# Patient Record
Sex: MALE | Race: BLACK OR AFRICAN AMERICAN | Employment: FULL TIME | URBAN - METROPOLITAN AREA
[De-identification: names, ages, dates, MRNs, and addresses within clinical notes are randomized per-mention and may not be internally consistent; named-entity substitution may affect disease eponyms.]

---

## 2018-08-23 ENCOUNTER — OFFICE VISIT (OUTPATIENT)
Dept: ORTHOPEDIC SURGERY | Age: 53
End: 2018-08-23

## 2018-08-23 VITALS
WEIGHT: 219 LBS | HEART RATE: 87 BPM | BODY MASS INDEX: 30.66 KG/M2 | TEMPERATURE: 98.1 F | SYSTOLIC BLOOD PRESSURE: 126 MMHG | HEIGHT: 71 IN | DIASTOLIC BLOOD PRESSURE: 69 MMHG | RESPIRATION RATE: 15 BRPM

## 2018-08-23 DIAGNOSIS — M99.05 PELVIC SOMATIC DYSFUNCTION: ICD-10-CM

## 2018-08-23 DIAGNOSIS — M99.04 SACRAL REGION SOMATIC DYSFUNCTION: ICD-10-CM

## 2018-08-23 DIAGNOSIS — M51.36 DEGENERATIVE DISC DISEASE, LUMBAR: Primary | ICD-10-CM

## 2018-08-23 DIAGNOSIS — Z98.890 H/O LAMINECTOMY: ICD-10-CM

## 2018-08-23 DIAGNOSIS — M99.03 LUMBAR REGION SOMATIC DYSFUNCTION: ICD-10-CM

## 2018-08-23 DIAGNOSIS — G57.02 PIRIFORMIS SYNDROME, LEFT: ICD-10-CM

## 2018-08-23 DIAGNOSIS — M48.061 FORAMINAL STENOSIS OF LUMBAR REGION: ICD-10-CM

## 2018-08-23 RX ORDER — PREDNISONE 20 MG/1
TABLET ORAL
Qty: 28 TAB | Refills: 0 | Status: SHIPPED | OUTPATIENT
Start: 2018-08-23 | End: 2018-10-01 | Stop reason: ALTCHOICE

## 2018-08-23 RX ORDER — OXYCODONE AND ACETAMINOPHEN 10; 325 MG/1; MG/1
1 TABLET ORAL
Qty: 21 TAB | Refills: 0 | Status: SHIPPED | OUTPATIENT
Start: 2018-08-23 | End: 2018-10-01 | Stop reason: SDUPTHER

## 2018-08-23 RX ORDER — IBUPROFEN 200 MG
TABLET ORAL
COMMUNITY

## 2018-08-23 NOTE — MR AVS SNAPSHOT
Layne Mejia 
 
 
 [address] [facility], [address] [facility] [address] 
[phone] Patient: Maurizio Lobo MRN: [id number] :1965 Visit Information Date & Time Provider Department Dept. Phone Encounter #  
 2018  2:20 PM Jorge Tay, [address] [facility] [phone] [id number] Follow-up Instructions Return in about 6 weeks (around 10/4/2018) for low back disc, piriformis left. Follow-up and Disposition History Upcoming Health Maintenance Date Due Hepatitis C Screening 1965 Pneumococcal 19-64 Medium Risk (1 of 1 - PPSV23) 3/28/1984 DTaP/Tdap/Td series (1 - Tdap) 3/28/1986 FOBT Q 1 YEAR AGE 50-75 3/28/2015 Influenza Age 5 to Adult 2018 Allergies as of 2018  Review Complete On: 2018 By: Jorge Tay,  No Known Allergies Current Immunizations  Never Reviewed No immunizations on file. Not reviewed this visit You Were Diagnosed With   
  
 Codes Comments Degenerative disc disease, lumbar    -  Primary ICD-10-CM: M51.36 
ICD-9-CM: 722.52 Foraminal stenosis of lumbar region     ICD-10-CM: M99.83 ICD-9-CM: 724.02 Piriformis syndrome, left     ICD-10-CM: G57.02 
ICD-9-CM: 355.0 H/O laminectomy     ICD-10-CM: Z98.890 ICD-9-CM: V45.89 Pelvic somatic dysfunction     ICD-10-CM: M99.05 
ICD-9-CM: 739.5 Sacral region somatic dysfunction     ICD-10-CM: M99.04 
ICD-9-CM: 739.4 Lumbar region somatic dysfunction     ICD-10-CM: M99.03 
ICD-9-CM: 739.3 Vitals BP Pulse Temp Resp Height(growth percentile) Weight(growth percentile) 126/69 87 98.1 °F (36.7 °C) 15 5' 11\" (1.803 m) 219 lb (99.3 kg) BMI Smoking Status 30.54 kg/m2 Current Every Day Smoker Vitals History BMI and BSA Data Body Mass Index Body Surface Area 30.54 kg/m 2 2.23 m 2 Preferred Pharmacy Pharmacy Name Phone Theresa [address] [place] [address] [address] Cherie Chaney Your Updated Medication List  
  
   
This list is accurate as of 8/23/18  3:25 PM.  Always use your most recent med list.  
  
  
  
  
 ibuprofen 200 mg tablet Commonly known as:  MOTRIN Take  by mouth. oxyCODONE-acetaminophen  mg per tablet Commonly known as:  PERCOCET Take 1 Tab by mouth every eight (8) hours as needed for Pain. Max Daily Amount: 3 Tabs. predniSONE 20 mg tablet Commonly known as:  Reino Betancourt Take 2 tabs in AM with food for 7 days then 1 tab in AM with food until gone Prescriptions Printed Refills  
 oxyCODONE-acetaminophen (PERCOCET)  mg per tablet 0 Sig: Take 1 Tab by mouth every eight (8) hours as needed for Pain. Max Daily Amount: 3 Tabs. Class: Print Route: Oral  
  
Prescriptions Sent to Pharmacy Refills  
 predniSONE (DELTASONE) 20 mg tablet 0 Sig: Take 2 tabs in AM with food for 7 days then 1 tab in AM with food until gone Class: Normal  
 Pharmacy: [facility] [address] [place] [address] #: [phone] We Performed the Following [place] OSTEOPATHIC MANIP,3-4 BODY REGN [id number] CPT(R)] REFERRAL TO PHYSICAL THERAPY [VJG49 Custom] Comments:  
 Evaluate and Treat 3 per week for 6-8 weeks [phone] Follow-up Instructions Return in about 6 weeks (around 10/4/2018) for low back disc, piriformis left. Referral Information Referral ID Referred By Referred To  
  
 [id number] [facility] Shadia Rutledge [address] [place] [address] Drive PT MAGNO [address] Phone: [phone] Fax: [phone] Visits Status Start Date End Date 1 New Request 8/23/18 8/23/19  If your referral has a status of pending review or denied, additional information will be sent to support the outcome of this decision. Introducing [facility]! Denilson Villarreal introduces [employer] patient portal. Now you can access parts of your medical record, email your doctor's office, and request medication refills online. 1. In your internet browser, go to [email or web address] 2. Click on the First Time User? Click Here link in the Sign In box. You will see the New Member Sign Up page. 3. Enter your [employer] Access Code exactly as it appears below. You will not need to use this code after youve completed the sign-up process. If you do not sign up before the expiration date, you must request a new code. · [employer] Access Code: [email or web address] Expires: 11/21/2018  3:25 PM 
 
4. Enter the last four digits of your Social Security Number (xxxx) and Date of Birth (mm/dd/yyyy) as indicated and click Submit. You will be taken to the next sign-up page. 5. Create a Instilling Values ID. This will be your [employer] login ID and cannot be changed, so think of one that is secure and easy to remember. 6. Create a [employer] password. You can change your password at any time. 7. Enter your Password Reset Question and Answer. This can be used at a later time if you forget your password. 8. Enter your e-mail address. You will receive e-mail notification when new information is available in [address] 9. Click Sign Up. You can now view and download portions of your medical record. 10. Click the Download Summary menu link to download a portable copy of your medical information. If you have questions, please visit the Frequently Asked Questions section of the [employer] website. Remember, [employer] is NOT to be used for urgent needs. For medical emergencies, dial 911. Now available from your iPhone and Android! Please provide this summary of care documentation to your next provider. Your primary care clinician is listed as Vicky Canales.  If you have any questions after today's visit, please call [phone].

## 2018-08-23 NOTE — PROGRESS NOTES
HISTORY OF PRESENT ILLNESS    Shayy Serrano is a 48y.o. year old male comes in today as new patient for: low back pain    Has had pain low back for years and had surgery with Dr. Roberth Olguin 2008 hemilaminectomy L5-S1. Which helped Sx but ha shad epidural injections off and on for years but had to stop getting them as he would be out of work for several months after. Back went out [id number] and was to see Dr. Roberth Olguin and had MRI but stopped participating with his insurance so is here to see me. Has been trying to work through it with ibuprofen but worsening. No PT in many years and no stretch/HEP since 2017. Last prednisone a few months ago. 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 Had been in pain management prior. IMAGING: MRI lumbar 7/14/18   IMPRESSION:  Disc bulge left S1 descending nerve root w/ left lateral foraminal stenosis. Social History     Social History    Marital status: UNKNOWN     Spouse name: N/A    Number of children: N/A    Years of education: N/A     Social History Main Topics    Smoking status: Current Every Day Smoker     Types: Cigarettes    Smokeless tobacco: Never Used    Alcohol use Yes      Comment: occasionally    Drug use: No    Sexual activity: Not Asked     Other Topics Concern    None     Social History Narrative     Current Outpatient Prescriptions   Medication Sig Dispense Refill    ibuprofen (MOTRIN) 200 mg tablet Take  by mouth. Past Medical History:   Diagnosis Date    Acid reflux     Chronic back pain     Hematuria     Hematuria, microscopic     Nocturia      Family History   Problem Relation Age of Onset    Family history unknown: Yes         ROS:  Numb/tingle left left from buttocks. No incont, fever. All other systems reviewed and negative aside from that written in the HPI.       Objective:  /69  Pulse 87  Temp 98.1 °F (36.7 °C)  Resp 15  Ht 5' 11\" (1.803 m)  Wt 219 lb (99.3 kg)  BMI 30.54 kg/m2  GEN:  Appears stated age in NAD. NEURO:  Sensation intact to light touch. Reflexes +2/4 patellar and Achilles bilaterally. M/S:  Examined seated and supine. Slump negative. Standing flexion test positive right  Stork positive left. ASIS low right  Iliac crests equal bilaterally Pubes equal bilaterally Medial malleolus low right  Sacral base posterior left  JENNIFER low right  Sphinx test Positive left TTA at L5 on left worse flexion Rib(s) not TTP and equal LE Strength +5/5 bilaterally Piriformis tight and TTP left  EXT:  no clubbing/cyanosis. no edema. SKIN: Warm & dry w/o rash. HEENT: Conjunctiva/lids WNL. External canals/nares WNL. Tongue midline. PERRL, EOMI. Hearing intact. NECK: Trachea midline. Supple, Full ROM. No thyromegaly. CARDIAC: No edema. LUNGS: Normal effort. ABD: Soft, no masses. No HSM. PSYCH: A+O x3. Appropriate judgment and insight. Assessment/Plan:     ICD-10-CM ICD-9-CM    1. Degenerative disc disease, lumbar M51.36 722. 52 predniSONE (DELTASONE) 20 mg tablet      REFERRAL TO PHYSICAL THERAPY      oxyCODONE-acetaminophen (PERCOCET)  mg per tablet   2. Foraminal stenosis of lumbar region M99.83 724.02    3. Piriformis syndrome, left G57.02 355.0 predniSONE (DELTASONE) 20 mg tablet      REFERRAL TO PHYSICAL THERAPY      oxyCODONE-acetaminophen (PERCOCET)  mg per tablet   4. H/O laminectomy Z98.890 V45.89 predniSONE (DELTASONE) 20 mg tablet      REFERRAL TO PHYSICAL THERAPY      oxyCODONE-acetaminophen (PERCOCET)  mg per tablet   5. Pelvic somatic dysfunction M99.05 739.5 [place] OSTEOPATHIC MANIP,3-4 BODY REGN   6. Sacral region somatic dysfunction M99.04 739.4 [place] OSTEOPATHIC MANIP,3-4 BODY REGN   7. Lumbar region somatic dysfunction M99.03 739.3 OK OSTEOPATHIC MANIP,3-4 BODY REGN       Patient (or guardian if minor) verbalizes understanding of evaluation and plan. Verbal consent obtained.   Lumbar, Pelvic and Sacral SD treated with ME. Correction of previous malalignments verified after Tx. Pt tolerated well. Notes improvement of Sx and pain is now rated 2-3/10. HEP/stretches daily. Discussed stretching/strengthening/posture. Completed form for [employer] off work 6 weeks and will Rx prednisone and short term percocet after review  and RTC 6 weeks. Discussed no long term opiate Rx. Time with Pt 63 minutes, >50% of which was counseling pt regarding Dx and Tx options completion of forms and review of records and coordination of care.

## 2018-08-30 ENCOUNTER — HOSPITAL ENCOUNTER (OUTPATIENT)
Dept: PHYSICAL THERAPY | Age: 53
End: 2018-08-30

## 2018-09-07 ENCOUNTER — HOSPITAL ENCOUNTER (OUTPATIENT)
Dept: PHYSICAL THERAPY | Age: 53
Discharge: HOME | End: 2018-09-07
Payer: COMMERCIAL

## 2018-09-07 PROCEDURE — 97014 ELECTRIC STIMULATION THERAPY: CPT

## 2018-09-07 PROCEDURE — 97162 PT EVAL MOD COMPLEX 30 MIN: CPT

## 2018-09-07 PROCEDURE — 97110 THERAPEUTIC EXERCISES: CPT

## 2018-09-07 NOTE — PROGRESS NOTES
PT LUMBAR EVAL AND TREATMENT Patient Name: Ann Hawkins Date:2018 : 1965 [x]  Patient  Verified Payor: BLUE CROSS / Plan: [facility] PPO / Product Type: PPO / In time:945  Out time:1030 Total Treatment Time (min): 45 Total Timed Codes (min): 15 
1:1 Treatment Time ( only):  
Visit #: 1  Treatment Area: Other intervertebral disc degeneration, lumbar region [M51.36] Lesion of sciatic nerve, left lower limb [G57.02] SUBJECTIVE Pain Level (0-10 on visual analog scale): 9 Any medication changes, allergies to medications, diagnosis change, or new procedure performed: see summary sheet for update Assessment / key information:  Patient is a 48 y.o. male who presents to [facility] with a diagnosis of Other intervertebral disc degeneration, lumbar region [M51.36] Lesion of sciatic nerve, left lower limb [G57.02]. Patient is his own historian. Living situation is as follows: lives in 2 story house and sleeps in a recliner on 1st floor. Occupation: [employer]. Pt presents with c/o chronic LBP which became aggravated last summer. 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 Pt currently presents with LBP (R>L) which travels posterodistally to bilateral gastrocs. No numbness/LE buckling/LOB/falls. He does not use an AD. Pain is \"sharp and burning\". Pain increases with any prolonged positioning and with car/chair transfers/bed mobility/ambulating/and standing activities. Pt has had multiple epidurals in the L-S in the past and is unable to recall the dates. He also went to pain management in the past and had good results and had returned to working out afterwards to manage pain levels. Pain is constant and he is unable to identify any alleviating factors at this time.   
Current Deficits include: pain, decreased mobility, decreased strength, and decreased postural awareness with resulting limitations in ADL's and in functional abilities. Clinical exam performed and objective measurements difficult to obtain secondary to c/o pain throughout entirety of session and decreased tolerance to light touch, pressure, and position changes. + hypersensitivity to light touch. Impairments are as follows: 
 
Pain +TTP right sacral sulcus, right piriformis, and bilateral L-S paraspinals to light touch Posture trunk in 15 degrees of flexion in stance and unable to attain neutral position/extension; flattened L-S; bilateral hip flexion; bilateral genu varum and ER of hips Gait flexed antalgic gait with decreased reciprocal arm swing, no terminal hip extension bilaterally, and decreased stance RLE>LLE in midstance; decreased knee flexion bilaterally and push off AROM/PROM in degrees L-S Flexion 50 + pain Extension 0 Bilateral side flexion 10 Bilateral hip IR 10 Bilateral hip extension 0 Strength Bilateral hip flexion 3-/5 Bilateral hip extension 3-/5 Bilateral hip abduction 3/5 Bilateral knee extension and DF WFLs Special Tests + TANVI for decreased radiculopathy in LEs 
+ flexion dysfunction 
+ SI spring test 
+ right deep sacral sulcus and Gillets indicating hypomobility and possible left on left torsion Unable to accurately perform seated flexion test or standing flexion test for SI joint 
+ ASLR test at 35 degrees flexion indicative of decreased neural mobility at sciatic nn BLE Functional Tests 25% bridge with pain 
 
 
 
Manual  min:  
Rationale: provided to improve the patient's ability to Modality (rationale):  
[x]  E-Stim: type IF_ x 15_ min     []att   []unatt   []w/US   [x]w/ice   []w/heat to L-S in prone 
[]  Traction: []cerv   []pelvic   _ lbs x _ min     []pro   []sup   []int   []const 
[]  Ultrasound: []cont   []pulse    _ W/cm2 x _  min   []1MHz   []3MHz []  Iontophoresis: []take home patch w/ dexamethazone    []40mA   []80mA 
                             []_ mA min w/: []dexamethazone   []other:_ 
[]  Ice pack _  min     [] Hot pack _  min     [] Paraffin _  min 
[]  Other:  
Rationale: provided to improve the patient's ability to ambulate after session Patient Education: [x]Established HEP    [x] POT  (minutes) :15min HEP reviewed and provided Rationale: provided to improve the patient's ability to perform ADLs Pain Level (0-10 scale) post treatment: 8 
 
ASSESSMENT [x]  See Plan of Care PLAN [x]  Upgrade activities as tolerated    
[x] Other:_ See POC for Frequency/duration of visits Justification for Eval Code Complexity:  
Patient History : see POC Examination: (see exam) Clinical Presentation: unpredictable Clinical Decision Making : FOTO : 39 /100  
 
G-Codes (GP):  
 
Monisha Decker, PT 9/7/2018  12:43 PM

## 2018-09-07 NOTE — PROGRESS NOTES
[facility] PHYSICAL THERAPY AT [address] [facility] [address], [address], [place] - Phone: [phone]  Fax: [phone] PLAN OF CARE / STATEMENT OF MEDICAL NECESSITY FOR PHYSICAL THERAPY SERVICES Patient Name: Valerie Martinez : 1965 Medical  
Diagnosis: Other intervertebral disc degeneration, lumbar region [M51.36] Lesion of sciatic nerve, left lower limb [G57.02] Treatment Diagnosis: LBP Onset Date: Summer 2017 Referral Source: Juliette Yan [facility]): 2018 Prior Hospitalization: See medical history Provider #: [id number] Prior Level of Function: No pain when getting in/out of bed/car/driving/stairs/ambulating/standing Comorbidities: Tobacco use; L-S laminectomy  Medications: Verified on Patient Summary List  
The Plan of Care and following information is based on the information from the initial evaluation.  
=========================================================================================== Assessment / key information:  Patient is a 48 y.o. male who presents to [facility] with a diagnosis of Other intervertebral disc degeneration, lumbar region [M51.36] Lesion of sciatic nerve, left lower limb [G57.02]. Patient is his own historian. Living situation is as follows: lives in 2 [address] house and sleeps in a recliner on 1st floor. Occupation: [employer]. Pt presents with c/o chronic LBP which became aggravated last summer. 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 Pt currently presents with LBP (R>L) which travels posterodistally to bilateral gastrocs. No numbness/LE buckling/LOB/falls. He does not use an AD. Pain is \"sharp and burning\". Pain increases with any prolonged positioning and with car/chair transfers/bed mobility/ambulating/and standing activities.  Pt has had multiple epidurals in the L-S in the past and is unable to recall the dates. He also went to pain management in the past and had good results and had returned to working out afterwards to manage pain levels. Pain is constant and he is unable to identify any alleviating factors at this time. Current Deficits include: pain, decreased mobility, decreased strength, and decreased postural awareness with resulting limitations in ADL's and in functional abilities. Clinical exam performed and objective measurements difficult to obtain secondary to c/o pain throughout entirety of session and decreased tolerance to light touch, pressure, and position changes. + hypersensitivity to light touch. Impairments are as follows: 
 
Pain +TTP right sacral sulcus, right piriformis, and bilateral L-S paraspinals to light touch Posture trunk in 15 degrees of flexion in stance and unable to attain neutral position/extension; flattened L-S; bilateral hip flexion; bilateral genu varum and ER of hips Gait flexed antalgic gait with decreased reciprocal arm swing, no terminal hip extension bilaterally, and decreased stance RLE>LLE in midstance; decreased knee flexion bilaterally and push off AROM/PROM in degrees L-S Flexion 50 + pain Extension 0 Bilateral side flexion 10 Bilateral hip IR 10 Bilateral hip extension 0 Strength Bilateral hip flexion 3-/5 Bilateral hip extension 3-/5 Bilateral hip abduction 3/5 Bilateral knee extension and DF WFLs Special Tests + TANVI for decreased radiculopathy in LEs 
+ flexion dysfunction 
+ SI spring test 
+ right deep sacral sulcus and Gillets indicating hypomobility and possible left on left torsion Unable to accurately perform seated flexion test or standing flexion test for SI joint 
+ ASLR test at 35 degrees flexion indicative of decreased neural mobility at sciatic nn BLE Functional Tests 25% bridge with pain Functional Deficits include: difficulty getting: in/out of car, bed, chair; difficulty standing and ambulating. Patient's FOTO score was a 39/100 indicating decreased function. Patient will benefit from a POC addressing such impairments and limitations in order to improve quality of life and return to PLOF. 
 
================================================================================== Eval Complexity: History: MEDIUM  Complexity : 1-2 comorbidities / personal factors will impact the outcome/ POC Exam:HIGH Complexity : 4+ Standardized tests and measures addressing body structure, function, activity limitation and / or participation in recreation  Presentation: HIGH Complexity : Unstable and unpredictable characteristics  Clinical Decision Making:MEDIUM Complexity : FOTO score of 26-74Overall Complexity:MEDIUM Problem List: pain affecting function, decrease ROM, decrease strength, edema affecting function, impaired gait/ balance, decrease ADL/ functional abilitiies, decrease activity tolerance, decrease flexibility/ joint mobility and decrease transfer abilities Treatment Plan may include any combination of the following: Therapeutic exercise, Therapeutic activities, Neuromuscular re-education, Physical agent/modality, Gait/balance training, Manual therapy and Patient education Patient / Family readiness to learn indicated by: asking questions, trying to perform skills and interest 
Persons(s) to be included in education: patient (P) Barriers to Learning/Limitations: None Measures taken: A HEP was initiated to assist with POC in restoring function; IFC ES and ice for pain Patient Goal (s): Get back to normal and get rid of pain Patient self reported health status: poor Rehabilitation Potential: fair secondary to past PT, pain management, chronic condition ? Short Term Goals: To be accomplished in  2  treatments: 1. Pt will be compliant with HEP for symptom management at home. ? Long Term Goals: To be accomplished in  8-12  treatments: 1. Pt will demonstrate an increased FOTO score to 56/100 in order to improve function 2. Pt will be independent with HEP at D/C for self management. 3. Pt will be able to perform a bridge at > 75% completion without pain in order to increase lumbopelvic stability and reduce future injury 4. Pt will be able to bend to the floor to put on shoes without LBP in order to perform functional activities requiring L-S flexion 5. Pt will improve bilateral hip flexion by 5 degrees actively in order to increase terminal hip extension with ambulation 6. Pt will report a return to sleeping in his bed in order to prevent secondary impairments associated with long term recliner use Frequency / Duration:   Patient to be seen  2  times per week for 8-12  treatments: 
Patient / Caregiver education and instruction: activity modification and exercises G-Codes (GP):  
Therapist Signature: Stephanie Pinedo PT Date: 9/7/2018 Certification Period:  Time: 10:14 AM  
=========================================================================================== I certify that the above Physical Therapy Services are being furnished while the patient is under my care. I agree with the treatment plan and certify that this therapy is necessary. Physician Signature:        Date:       Time:    
Please sign and return to [facility] or you may fax the signed copy to  [phone]. Thank you.

## 2018-09-10 ENCOUNTER — HOSPITAL ENCOUNTER (OUTPATIENT)
Dept: PHYSICAL THERAPY | Age: 53
Discharge: HOME | End: 2018-09-10
Payer: COMMERCIAL

## 2018-09-10 PROCEDURE — 97014 ELECTRIC STIMULATION THERAPY: CPT

## 2018-09-10 PROCEDURE — 97530 THERAPEUTIC ACTIVITIES: CPT

## 2018-09-10 NOTE — PROGRESS NOTES
PHYSICAL THERAPY - DAILY TREATMENT NOTE Patient Name: Ronak Carroll        Date: 9/10/2018 : 1965   YES Patient  Verified Visit #:   2     Insurance: Payor: BLUE CROSS / Plan: [facility] PPO / Product Type: PPO / In time: 2:31 pm Out time: 3:04pm  
Total Treatment Time: 31 Medicare Time Tracking (below) Total Timed Codes (min): na 1:1 Treatment Time:  n/a  
 
TREATMENT AREA =  Other intervertebral disc degeneration, lumbar region [M51.36] Lesion of sciatic nerve, left lower limb [G57.02] SUBJECTIVE Pain Level (on 0 to 10 scale):  7 / 10- low back, left lower extremity Medication Changes/New allergies or changes in medical history, any new surgeries or procedures? NO    If yes, update Summary List  
Subjective Functional Status/Changes:  []  No changes reported Intermittent pain into left foot. Yesterday it was bothering me in the right leg. A few days ago. OBJECTIVE Modalities Rationale:     decrease edema, decrease inflammation, decrease pain and increase tissue extensibility to improve patient's ability to perform functional ADLs 10 min [x] Estim, type/location: IFC low back, post hip; 7 min prone over 2 pillows; right side lyling    
                                 []  att     [x]  unatt     []  w/US     [x]  w/ice    []  w/heat  
 min []  Mechanical Traction: type/lbs   
               []  pro   []  sup   []  int   []  cont    []  before manual    []  after manual  
 min []  Ultrasound, settings/location:    
 min []  Iontophoresis w/ dexamethasone, location:   
                                           []  take home patch       []  in clinic  
 min []  Ice     []  Heat    location/position:   
 min []  Vasopneumatic Device, press/temp:   
 min []  Other:   
[x] Skin assessment post-treatment (if applicable):   
[x]  intact    []  redness- no adverse reaction    
[]redness  adverse reaction: 13 min Therapeutic Exercise:  [x]  See flow sheet Rationale:      increase ROM and increase strength to improve the patients ability to perform functional ADLs 8 min Therapeutic Activity: Pt education in neutral spine positioning with use of towel roll in sitting, sleeping positioning, positioning for centralization, body mechanics for donning and doffing shoes Rationale:    increase ROM and increase strength to improve the patients ability to perform functional ADLS 
 
 min Patient Education:  YES  Reviewed HEP []  Progressed/Changed HEP based on: Other Objective/Functional Measures: 
 
Review abdominal draw and glut set exercise, positioning for centrlaization Post Treatment Pain Level (on 0 to 10) scale:  6-7  / 10- low back; left hip ASSESSMENT Assessment/Changes in Function:  
Pt able to centralize pain to left hip with prone over 2 pillows. Unable to abolish pain during session. Pt moved to right sidelying after ~ 7 min of prone lying over 2 pillows to ease symptoms. []  See Progress Note/Recertification Patient will continue to benefit from skilled PT services to modify and progress therapeutic interventions, address functional mobility deficits, address ROM deficits, address strength deficits, analyze and address soft tissue restrictions and instruct in home and community integration to attain remaining goals. Progress toward goals / Updated goals: 1. Pt will be compliant with HEP for symptom management at home.-goal in progress PLAN 
[]  Upgrade activities as tolerated YES Continue plan of care  
[]  Discharge due to :   
[]  Other:   
 
Therapist: Pavan Hudson PTA Date: 9/10/2018 Time: 3:04  PM  
 
Future Appointments Date Time Provider Keesha Smith 9/13/2018 3:00 PM Pavan Hudson PTA [facility]  
9/18/2018 2:30 PM Pavan Hudson PTA [facility]  
9/20/2018 3:00 PM Pavan Hudson PTA [facility]  
9/25/2018 2:30 PM Pavan Hudson PTA [facility]  
 9/27/2018 3:00 PM Harika Haile PTA [facility]  
10/2/2018 2:30 PM Harika Haile PTA [facility]

## 2018-09-13 ENCOUNTER — APPOINTMENT (OUTPATIENT)
Dept: PHYSICAL THERAPY | Age: 53
End: 2018-09-13
Payer: COMMERCIAL

## 2018-09-17 ENCOUNTER — TELEPHONE (OUTPATIENT)
Dept: ORTHOPEDIC SURGERY | Age: 53
End: 2018-09-17

## 2018-09-17 DIAGNOSIS — M51.36 DEGENERATIVE DISC DISEASE, LUMBAR: Primary | ICD-10-CM

## 2018-09-17 NOTE — TELEPHONE ENCOUNTER
Contacted pt at his number at this time. Informed him that a referral has been sent for Dr Sanju Bravo for the epidural injections.   Pt very thankful for the help with getting him scheduled for an epidural

## 2018-09-18 ENCOUNTER — APPOINTMENT (OUTPATIENT)
Dept: PHYSICAL THERAPY | Age: 53
End: 2018-09-18
Payer: COMMERCIAL

## 2018-09-20 ENCOUNTER — HOSPITAL ENCOUNTER (OUTPATIENT)
Dept: PHYSICAL THERAPY | Age: 53
Discharge: HOME | End: 2018-09-20
Payer: COMMERCIAL

## 2018-09-20 PROCEDURE — 97110 THERAPEUTIC EXERCISES: CPT

## 2018-09-20 PROCEDURE — 97014 ELECTRIC STIMULATION THERAPY: CPT

## 2018-09-20 NOTE — PROGRESS NOTES
PHYSICAL THERAPY - DAILY TREATMENT NOTE Patient Name: Valerie Martinez        Date: 2018 : 1965   YES Patient  Verified Visit #:   3     Insurance: Payor: BLUE CROSS / Plan: [facility] PPO / Product Type: PPO / In time: 3:16 Out time: 3:55 pm  
Total Treatment Time: 44 Medicare Time Tracking (below) Total Timed Codes (min):  na 1:1 Treatment Time:  na  
 
TREATMENT AREA =  Other intervertebral disc degeneration, lumbar region [M51.36] Lesion of sciatic nerve, left lower limb [G57.02] SUBJECTIVE Pain Level (on 0 to 10 scale):  6  / 10 Medication Changes/New allergies or changes in medical history, any new surgeries or procedures? NO    If yes, update Summary List  
Subjective Functional Status/Changes:  []  No changes reported For the past weekend there was no position that would help me. Today its not as bad as it was down the right side all weekend. Spoke with MD. Waiting to get scheduled for an epidural. I couldn't get out of bed this weekend. OBJECTIVE Modalities Rationale:     decrease edema, decrease inflammation, decrease pain and increase tissue extensibility to improve patient's ability to perform functional ADLs 10 min [x] Estim, type/location: Low back, hip IFC []  att     [x]  unatt     []  w/US     [x]  w/ice    []  w/heat  
 min []  Mechanical Traction: type/lbs   
               []  pro   []  sup   []  int   []  cont    []  before manual    []  after manual  
 min []  Ultrasound, settings/location:    
 min []  Iontophoresis w/ dexamethasone, location:   
                                           []  take home patch       []  in clinic  
 min []  Ice     []  Heat    location/position:   
 min []  Vasopneumatic Device, press/temp:   
 min []  Other:   
[x] Skin assessment post-treatment (if applicable):   
[x]  intact    []  redness- no adverse reaction []redness  adverse reaction:     
 
 
29 min Therapeutic Activity: Postioning for centralization, pt education in neutral spine body mechanics, use of towel roll in sitting , activity modification for pain managment Rationale:    increase ROM and increase strength to improve the patients ability to  Perform ADLs 
 
 
 min Patient Education:  Miracle Adames []  Progressed/Changed HEP based on: Other Objective/Functional Measures: 
 
Prone positioning over 1-2 pillows, prone knee flexion, standing trunk extension, pt education in centralization and peripheralization of symptoms and self management, use of ice/moist heat Post Treatment Pain Level (on 0 to 10) scale:  5/ 10-low back ASSESSMENT Assessment/Changes in Function:  
Prone lying over 2 pillow abolishes pain. Sustained prone lying over 2 pillows producing R LE sx. Positioning changed to right sidelying decreasing radicular symptoms. Pt education in positioning for centralization as tolerated. Pt able to centralize pain to low back at end of session. Unable to fully abolish. []  See Progress Note/Recertification Patient will continue to benefit from skilled PT services to modify and progress therapeutic interventions, address functional mobility deficits, address ROM deficits, address strength deficits, analyze and address soft tissue restrictions and instruct in home and community integration to attain remaining goals. Progress toward goals / Updated goals: 
Slow progress toward all goals. Partially met STG for HEP. PLAN 
[]  Upgrade activities as tolerated YES Continue plan of care  
[]  Discharge due to :   
[]  Other:   
 
Therapist: Rebecca Saleem PTA Date: 9/20/2018 Time: 3:55  PM  
 
Future Appointments Date Time Provider Keesha Smith 9/25/2018 2:30 PM Rebecca Saleem PTA [facility]  
9/27/2018 3:00 PM Rebecca Saleem PTA [facility]  
10/1/2018 1:00 PM Rolando Gruber, [address]  
 10/2/2018 2:30 PM Cristino Pallas, PTA [facility]

## 2018-09-25 ENCOUNTER — HOSPITAL ENCOUNTER (OUTPATIENT)
Dept: PHYSICAL THERAPY | Age: 53
Discharge: HOME | End: 2018-09-25
Payer: COMMERCIAL

## 2018-09-25 PROCEDURE — 97140 MANUAL THERAPY 1/> REGIONS: CPT

## 2018-09-25 PROCEDURE — 97110 THERAPEUTIC EXERCISES: CPT

## 2018-09-25 PROCEDURE — 97014 ELECTRIC STIMULATION THERAPY: CPT

## 2018-09-25 NOTE — PROGRESS NOTES
PHYSICAL THERAPY - DAILY TREATMENT NOTE Patient Name: Ricci Parrish        Date: 2018 : 1965   YES Patient  Verified Visit #:   4     Insurance: Payor: BLUE CROSS / Plan: [facility] PPO / Product Type: PPO / In time: 2:26 pm Out time: 2:57 pm  
Total Treatment Time: 31 Medicare Time Tracking (below) Total Timed Codes (min): na 1:1 Treatment Time:  na  
 
TREATMENT AREA =  Other intervertebral disc degeneration, lumbar region [M51.36] Lesion of sciatic nerve, left lower limb [G57.02] SUBJECTIVE Pain Level (on 0 to 10 scale):  5 / 10- left hip Medication Changes/New allergies or changes in medical history, any new surgeries or procedures? NO    If yes, update Summary List  
Subjective Functional Status/Changes:  []  No changes reported Pt reports he has walked to the corner store and back. No L LE leg pain since Saturday. Doing self massage release techniques OBJECTIVE Modalities Rationale:     decrease edema, decrease inflammation, decrease pain and increase tissue extensibility to improve patient's ability to perform ADLs 10 min [x] Estim, type/location: IFC low back /post hip over 1 pillow   
                                 []  att     [x]  unatt     []  w/US     [x]  w/ice    []  w/heat  
 min []  Mechanical Traction: type/lbs   
               []  pro   []  sup   []  int   []  cont    []  before manual    []  after manual  
 min []  Ultrasound, settings/location:    
 min []  Iontophoresis w/ dexamethasone, location:   
                                           []  take home patch       []  in clinic  
 min []  Ice     []  Heat    location/position:   
 min []  Vasopneumatic Device, press/temp:   
 min []  Other:   
[x] Skin assessment post-treatment (if applicable):   
[x]  intact    []  redness- no adverse reaction    
[]redness  adverse reaction:     
 
6 min Therapeutic Exercise:  [x]  See flow sheet Rationale:      increase ROM and increase strength to improve the patients ability to perform ADLs 10 min Manual Therapy: Prone left piriformis release; STM/DTM to lower lumbar paraspoinals. MFR to left QL Rationale:      decrease pain, increase ROM and decrease trigger points to improve patient's ability to improve tissue mobility in ADLs 5 min Patient Education:  YES  Reviewed HEP, review self massage techniques []  Progressed/Changed HEP based on: Other Objective/Functional Measures: Add prone abdominal draw, review positioning, home walking , sitting positioning for pain management Post Treatment Pain Level (on 0 to 10) scale:   4  / 10- low back ASSESSMENT Assessment/Changes in Function:  
Pt now able to lie prone over 1 pillow versus 2 pillows that centralizes pain to Low back to 4-5/10. Pt demonstrating good centralization of symptoms with prone over 1 pillow. Decreasing trigger point tenderness noted left piriformis, moderate trigger point tenderness persists left Q-L 
  
[]  See Progress Note/Recertification Patient will continue to benefit from skilled PT services to modify and progress therapeutic interventions, address functional mobility deficits, address ROM deficits, address strength deficits and instruct in home and community integration to attain remaining goals. Progress toward goals / Updated goals: 
Partially met STG #1 for HEP. PLAN 
[]  Upgrade activities as tolerated YES Continue plan of care  
[]  Discharge due to :   
[]  Other:   
 
Therapist: Viktoria Casas PTA Date: 9/25/2018 Time: 2:57 PM  
 
Future Appointments Date Time Provider Keesha Smith 9/27/2018 3:00 PM Viktoria Casas PTA [facility]  
10/1/2018 1:00 PM DO SCOTT Marino  
10/2/2018 2:30 PM Viktoria Casas PTA [facility]

## 2018-09-27 ENCOUNTER — APPOINTMENT (OUTPATIENT)
Dept: PHYSICAL THERAPY | Age: 53
End: 2018-09-27
Payer: COMMERCIAL

## 2018-10-01 ENCOUNTER — OFFICE VISIT (OUTPATIENT)
Dept: ORTHOPEDIC SURGERY | Age: 53
End: 2018-10-01

## 2018-10-01 VITALS
TEMPERATURE: 98.5 F | HEIGHT: 71 IN | RESPIRATION RATE: 16 BRPM | SYSTOLIC BLOOD PRESSURE: 118 MMHG | WEIGHT: 213 LBS | BODY MASS INDEX: 29.82 KG/M2 | HEART RATE: 87 BPM | DIASTOLIC BLOOD PRESSURE: 84 MMHG

## 2018-10-01 DIAGNOSIS — Z98.890 H/O LAMINECTOMY: ICD-10-CM

## 2018-10-01 DIAGNOSIS — G57.02 PIRIFORMIS SYNDROME, LEFT: ICD-10-CM

## 2018-10-01 DIAGNOSIS — M51.36 DEGENERATIVE DISC DISEASE, LUMBAR: Primary | ICD-10-CM

## 2018-10-01 DIAGNOSIS — M48.061 FORAMINAL STENOSIS OF LUMBAR REGION: ICD-10-CM

## 2018-10-01 RX ORDER — OXYCODONE AND ACETAMINOPHEN 10; 325 MG/1; MG/1
1 TABLET ORAL
Qty: 21 TAB | Refills: 0 | Status: SHIPPED | OUTPATIENT
Start: 2018-10-01 | End: 2018-12-11 | Stop reason: SDUPTHER

## 2018-10-01 NOTE — MR AVS SNAPSHOT
[address] [place] [address], [address] [facility] [address] 
[phone] Patient: Ghulam Brito MRN: [id number] :1965 Visit Information Date & Time Provider Department Dept. Phone Encounter #  
 10/1/2018  1:00 PM Elsi Gutiérrez, [address] [facility] - [phone] [id number] Upcoming Health Maintenance Date Due Hepatitis C Screening 1965 Pneumococcal 19-64 Medium Risk (1 of 1 - PPSV23) 3/28/1984 DTaP/Tdap/Td series (1 - Tdap) 3/28/1986 Shingrix Vaccine Age 50> (1 of 2) 3/28/2015 FOBT Q 1 YEAR AGE 50-75 3/28/2015 Influenza Age 5 to Adult 2018 Allergies as of 10/1/2018  Review Complete On: 10/1/2018 By: Elsi Gutiérrez DO No Known Allergies Current Immunizations  Never Reviewed No immunizations on file. Not reviewed this visit You Were Diagnosed With   
  
 Codes Comments Degenerative disc disease, lumbar    -  Primary ICD-10-CM: M51.36 
ICD-9-CM: 722.52   
 H/O laminectomy     ICD-10-CM: Z98.890 ICD-9-CM: V45.89 Piriformis syndrome, left     ICD-10-CM: G57.02 
ICD-9-CM: 355.0 Foraminal stenosis of lumbar region     ICD-10-CM: M99.83 ICD-9-CM: 724.02 Vitals BP Pulse Temp Resp Height(growth percentile) Weight(growth percentile) 118/84 87 98.5 °F (36.9 °C) 16 5' 11\" (1.803 m) 213 lb (96.6 kg) BMI Smoking Status 29.71 kg/m2 Current Every Day Smoker Vitals History BMI and BSA Data Body Mass Index Body Surface Area  
 29.71 kg/m 2 2.2 m 2 Preferred Pharmacy Pharmacy Name Phone [facility] [address] [place] [address] Cherie Chaney Your Updated Medication List  
  
   
This list is accurate as of 10/1/18  1:23 PM.  Always use your most recent med list.  
  
  
  
  
 ibuprofen 200 mg tablet Commonly known as:  MOTRIN Take  by mouth. oxyCODONE-acetaminophen  mg per tablet Commonly known as:  PERCOCET Take 1 Tab by mouth every eight (8) hours as needed for Pain. Max Daily Amount: 3 Tabs. Prescriptions Printed Refills  
 oxyCODONE-acetaminophen (PERCOCET)  mg per tablet 0 Sig: Take 1 Tab by mouth every eight (8) hours as needed for Pain. Max Daily Amount: 3 Tabs. Class: Print Route: Oral  
  
We Performed the Following REFERRAL TO [facility] [HPD58 Custom] To-Do List   
 10/02/2018 2:30 PM  
  Appointment with Ivonne Chery PTA at [address] [place] [address],[address]  ([phone]) Referral Information Referral ID Referred By Referred To  
  
 [id number] UZAIR FRAUSTO [facility], [address] Phone: [phone] Fax: [phone] Visits Status Start Date End Date 1 New Request 10/1/18 10/1/19 If your referral has a status of pending review or denied, additional information will be sent to support the outcome of this decision. Introducing [facility]! [facility] introduces [employer] patient portal. Now you can access parts of your medical record, email your doctor's office, and request medication refills online. 1. In your internet browser, go to [email or web address] 2. Click on the First Time User? Click Here link in the Sign In box. You will see the New Member Sign Up page. 3. Enter your [employer] Access Code exactly as it appears below. You will not need to use this code after youve completed the sign-up process. If you do not sign up before the expiration date, you must request a new code. · [employer] Access Code: [email or web address] Expires: 11/21/2018  3:25 PM 
 
4. Enter the last four digits of your Social Security Number (xxxx) and Date of Birth (mm/dd/yyyy) as indicated and click Submit. You will be taken to the next sign-up page. 5. Create a [employer] ID. This will be your [employer] login ID and cannot be changed, so think of one that is secure and easy to remember. 6. Create a [employer] password. You can change your password at any time. 7. Enter your Password Reset Question and Answer. This can be used at a later time if you forget your password. 8. Enter your e-mail address. You will receive e-mail notification when new information is available in [address] 9. Click Sign Up. You can now view and download portions of your medical record. 10. Click the Download Summary menu link to download a portable copy of your medical information. If you have questions, please visit the Frequently Asked Questions section of the [employer] website. Remember, [employer] is NOT to be used for urgent needs. For medical emergencies, dial 911. Now available from your iPhone and Android! Please provide this summary of care documentation to your next provider. Your primary care clinician is listed as Lala Vergara. If you have any questions after today's visit, please call [phone].

## 2018-10-01 NOTE — PROGRESS NOTES
HISTORY OF PRESENT ILLNESS    Lili Fuentes is a 48y.o. year old male comes in today to be evaluated and treated for: back pain    Since last appt 1 month ago pain low back still 8/10 and even w/ PT will have some relief for a day or so but will still have pain into legs often. Interested in epidurals for relief as realizes running out of options. IMAGING: MRI lumbar 7/14/18   IMPRESSION:  Disc bulge left S1 descending nerve root w/ left lateral foraminal stenosis. Social History     Social History    Marital status:      Spouse name: N/A    Number of children: N/A    Years of education: N/A     Social History Main Topics    Smoking status: Current Every Day Smoker     Types: Cigarettes    Smokeless tobacco: Never Used    Alcohol use Yes      Comment: occasionally    Drug use: No    Sexual activity: Not Asked     Other Topics Concern    None     Social History Narrative     Current Outpatient Prescriptions   Medication Sig Dispense Refill    ibuprofen (MOTRIN) 200 mg tablet Take  by mouth.  oxyCODONE-acetaminophen (PERCOCET)  mg per tablet Take 1 Tab by mouth every eight (8) hours as needed for Pain. Max Daily Amount: 3 Tabs. 21 Tab 0     Past Medical History:   Diagnosis Date    Acid reflux     Chronic back pain     Hematuria     Hematuria, microscopic     Nocturia      Family History   Problem Relation Age of Onset    Family history unknown: Yes     ROS:  Numb/tingle left left from buttocks. No incont, fever. Objective:  /84  Pulse 87  Temp 98.5 °F (36.9 °C)  Resp 16  Ht 5' 11\" (1.803 m)  Wt 213 lb (96.6 kg)  BMI 29.71 kg/m2  GEN:  Appears stated age in NAD. NEURO:  Sensation intact to light touch. Reflexes +2/4 patellar and Achilles bilaterally. M/S:  Examined seated and supine. Slump negative. Standing flexion test positive right  Stork positive left.   ASIS low right  Iliac crests equal bilaterally Pubes equal bilaterally Medial malleolus low right  Sacral base posterior left  JENNIFER low right  Sphinx test Positive left TTA at L5 on left worse flexion Rib(s) not TTP and equal LE Strength +5/5 bilaterally Piriformis tight and TTP left  EXT:  no clubbing/cyanosis. no edema. SKIN: Warm & dry w/o rash. Assessment/Plan:     ICD-10-CM ICD-9-CM    1. Degenerative disc disease, lumbar M51.36 722.52 REFERRAL TO PHYSICIAL MEDICINE REHAB      oxyCODONE-acetaminophen (PERCOCET)  mg per tablet   2. H/O laminectomy Z98.890 V45.89 REFERRAL TO PHYSICIAL MEDICINE REHAB      oxyCODONE-acetaminophen (PERCOCET)  mg per tablet   3. Piriformis syndrome, left G57.02 355.0 oxyCODONE-acetaminophen (PERCOCET)  mg per tablet   4. Foraminal stenosis of lumbar region M99.83 724.02 REFERRAL TO PHYSICIAL MEDICINE REHAB      oxyCODONE-acetaminophen (PERCOCET)  mg per tablet       Patient (or guardian if minor) verbalizes understanding of evaluation and plan. Will refer PRM for likely epidurals and will refill perccoet #21 tabs after  reviewed. Form completed for [employer] and discussed will be transferring care to [facility] as I have nothing more to offer.

## 2018-10-01 NOTE — PROGRESS NOTES
Pt reports doing PT and states that sessions increase his pain. Pt expresses interest in epidural shots.

## 2018-10-02 ENCOUNTER — HOSPITAL ENCOUNTER (OUTPATIENT)
Dept: PHYSICAL THERAPY | Age: 53
Discharge: HOME | End: 2018-10-02
Payer: COMMERCIAL

## 2018-10-02 PROCEDURE — 97014 ELECTRIC STIMULATION THERAPY: CPT

## 2018-10-02 PROCEDURE — 97110 THERAPEUTIC EXERCISES: CPT

## 2018-10-02 PROCEDURE — 97140 MANUAL THERAPY 1/> REGIONS: CPT

## 2018-10-02 PROCEDURE — 97530 THERAPEUTIC ACTIVITIES: CPT

## 2018-10-02 NOTE — PROGRESS NOTES
PHYSICAL THERAPY - DAILY TREATMENT NOTE Patient Name: Abril Ernst        Date: 10/2/2018 : 1965   YES Patient  Verified Visit #:   5     Insurance: Payor: BLUE CROSS / Plan: [facility] PPO / Product Type: PPO / In time: 2:38  Out time: 3:25 pm  
Total Treatment Time: 52 Medicare Time Tracking (below) Total Timed Codes (min):  n/a 1:1 Treatment Time:  n/a  
 
TREATMENT AREA =  Other intervertebral disc degeneration, lumbar region [M51.36] Lesion of sciatic nerve, left lower limb [G57.02] SUBJECTIVE Pain Level (on 0 to 10 scale): 7  / 10- low back more than the hip Medication Changes/New allergies or changes in medical history, any new surgeries or procedures? NO    If yes, update Summary List  
Subjective Functional Status/Changes:  []  No changes reported Pt reports he saw Dr. Serina Bower setting him up for epidural this week. Pt reports he is getting referred to another Orthopedic. I 'm been doing the exercises and its been helping getting around over the weekend. Pt reports using ice 2-3x per day. OBJECTIVE Modalities Rationale:     decrease edema, decrease inflammation, decrease pain and increase tissue extensibility to improve patient's ability to perform functional ADLs 10 min [x] Estim, type/location: Prone over 1 pillow low back/posterior hip   
                                 []  att     [x]  unatt     []  w/US     [x]  w/ice    []  w/heat  
 min []  Mechanical Traction: type/lbs   
               []  pro   []  sup   []  int   []  cont    []  before manual    []  after manual  
 min []  Ultrasound, settings/location:    
 min []  Iontophoresis w/ dexamethasone, location:   
                                           []  take home patch       []  in clinic  
 min []  Ice     []  Heat    location/position:   
 min []  Vasopneumatic Device, press/temp:   
 min []  Other:   
[x] Skin assessment post-treatment (if applicable):   
 [x]  intact    []  redness- no adverse reaction    
[]redness  adverse reaction:     
 
37 min Therapeutic Exercise:  [x]  See flow sheet   ( 12 min billed) Rationale:      increase ROM and increase strength to improve the patients ability to perform functional ADLs 10 min Manual Therapy: Prone left piriformis release; STM/DTM to lower lumbar paraspoinals. MFR to left QL Rationale:      decrease pain, increase ROM, increase tissue extensibility and decrease trigger points to improve patient's ability to improve tissue mobility in ADLs 6 min Therapeutic Activity: Review neutral spine posture in sitting and standing, positioning for centralization Rationale:    increase ROM, increase strength and improve coordination to improve the patients ability to perform ADLs 3 min Patient Education:  YES  Reviewed HEP [x]  Progressed/Changed HEP based on: improving tolerance to exercise Other Objective/Functional Measures: Add supine abdominal draw, hip ABD/ADD YTB Post Treatment Pain Level (on 0 to 10) scale:   5 / 10-low back ASSESSMENT Assessment/Changes in Function:  
Pt demonstrating decreased muscle guarding in position changes. Pt demonstrating decreasing trigger point tenderness left piriformis after manual release. Increased muscle tension persists in bilateral lower lumbar paraspinals. Advanced gentle core stabilization strengthening in neutral spine. Pt unable to lie in prone without 1 pillow. []  See Progress Note/Recertification Patient will continue to benefit from skilled PT services to modify and progress therapeutic interventions, address functional mobility deficits, address ROM deficits, address strength deficits, analyze and address soft tissue restrictions, analyze and cue movement patterns, analyze and modify body mechanics/ergonomics and instruct in home and community integration to attain remaining goals. Progress toward goals / Updated goals: · Short Term Goals: To be accomplished in  2  treatments: 1. Pt will be compliant with HEP for symptom management at home.- goal met 10-02-18 
  
· Long Term Goals: To be accomplished in  8-12  treatments: 1. Pt will demonstrate an increased FOTO score to 56/100 in order to improve function 2. Pt will be independent with HEP at D/C for self management. 3. Pt will be able to perform a bridge at > 75% completion without pain in order to increase lumbopelvic stability and reduce future injury 4. Pt will be able to bend to the floor to put on shoes without LBP in order to perform functional activities requiring L-S flexion 5. Pt will improve bilateral hip flexion by 5 degrees actively in order to increase terminal hip extension with ambulation 6. Pt will report a return to sleeping in his bed in order to prevent secondary impairments associated with long term recliner use PLAN 
[]  Upgrade activities as tolerated YES Continue plan of care  
[]  Discharge due to :   
[]  Other:   
 
Therapist: Gregor Edmonds PTA Date: 10/2/2018 Time: 3:25  PM  
 
No future appointments.

## 2018-10-04 ENCOUNTER — APPOINTMENT (OUTPATIENT)
Dept: PHYSICAL THERAPY | Age: 53
End: 2018-10-04
Payer: COMMERCIAL

## 2018-10-09 ENCOUNTER — HOSPITAL ENCOUNTER (OUTPATIENT)
Dept: PHYSICAL THERAPY | Age: 53
Discharge: HOME | End: 2018-10-09
Payer: COMMERCIAL

## 2018-10-09 PROCEDURE — 97014 ELECTRIC STIMULATION THERAPY: CPT

## 2018-10-09 PROCEDURE — 97140 MANUAL THERAPY 1/> REGIONS: CPT

## 2018-10-09 PROCEDURE — 97110 THERAPEUTIC EXERCISES: CPT

## 2018-10-09 NOTE — PROGRESS NOTES
[address] [place] [address] PHYSICAL THERAPY AT [address] [facility] [address], [address], [address] - Phone: [phone]  Fax: [phone] PROGRESS NOTE Patient Name: William Ramos : 1965 Treatment/Medical Diagnosis: Other intervertebral disc degeneration, lumbar region [M51.36] Lesion of sciatic nerve, left lower limb [G57.02] Referral Source: Earlene Alan DO Date of Initial Visit: 18 Attended Visits: 6 Missed Visits: 2 SUMMARY OF TREATMENT Physical Therapy treatment has consisted of Therapeutic exercise for lumbar stabilization and ROM 
CURRENT STATUS Patient has progressed well in Physical Therapy, consistently reporting improving ROM, decreasing pain, and increased functional ability. Currently pain is 7/10 in low back with no radicular symptoms L LE x 2 days. Patient average pain is 7/10 . Functional deficits are prolonged positioning, chair/car transfers, bed mobiity, prolonged walking and standing. Functional improvements are prolonged sitting in chair bed mobility, walking, standing. Trunk AROM: flexion:~  40% left low back, extension:30% low back. AROM: hip flexion: R/L: 80/77 degrees. FOTO score improved from 39 to 50/100 indicating improving functional mobility. Patient would benefit from continued PT intervention in order to improve SI mobility, Flexibility and core strength, decrease/centralize pain, and for continued education on posture, body mechanics, and to address remaining impairments. Goal/Measure of Progress Goal Met?  
1.  1. Pt will be compliant with HEP for symptom management at home. Status at last Eval: n/a Current Status: Pt performing HEP. yes New Goals to be achieved in __4__  weeks: 1. Pt will demonstrate an increased FOTO score to 56/100 in order to improve function 2. Pt will be independent with HEP at D/C for self management.  
3. Pt will be able to perform a bridge at > 75% completion without pain in order to increase lumbopelvic stability and reduce future injury 4. Pt will be able to bend to the floor to put on shoes without LBP in order to perform functional activities requiring L-S flexion 5. Pt will improve bilateral hip flexion by 5 degrees actively in order to increase terminal hip extension with ambulation 6. Pt will report a return to sleeping in his bed in order to prevent secondary impairments associated with long term recliner use RECOMMENDATIONS Plan to continue 2x per week x 4 weeks If you have any questions/comments please contact us directly at  [phone]. Thank you for allowing us to assist in the care of your patient. LPTA Signature: Chidi Delvalle PTA  Date: 10/9/2018 PT Signature: Sebastián Caballero PT Time: 3:34 PM  
NOTE TO PHYSICIAN:  PLEASE COMPLETE THE ORDERS BELOW AND FAX TO [facility] Physical [facility]: [phone]. If you are unable to process this request in 24 hours please contact our office:  [phone]. 
 
___ I have read the above report and request that my patient continue as recommended.  
___ I have read the above report and request that my patient continue therapy with the following changes/special instructions:_________________________________________________________  
___ I have read the above report and request that my patient be discharged from therapy.   
 
Physician Signature:       Date:      Time:

## 2018-10-09 NOTE — PROGRESS NOTES
PHYSICAL THERAPY - DAILY TREATMENT NOTE Patient Name: Toan Arguello        Date: 10/9/2018 : 1965   YES Patient  Verified Visit #:   6     Insurance: Payor: BLUE CROSS / Plan: [facility] PPO / Product Type: PPO / In time: 3:29 pm Out time: 4:29 pm  
Total Treatment Time: 60 Medicare Time Tracking (below) Total Timed Codes (min):  na 1:1 Treatment Time:  na  
 
TREATMENT AREA =  Other intervertebral disc degeneration, lumbar region [M51.36] Lesion of sciatic nerve, left lower limb [G57.02] SUBJECTIVE Pain Level (on 0 to 10 scale):  7  / 10- low back Medication Changes/New allergies or changes in medical history, any new surgeries or procedures? NO    If yes, update Summary List  
Subjective Functional Status/Changes:  []  No changes reported Pt reports using ice every day. No radicular pain symptoms x 3 days Pt reports prone knee flexion HEP seems to relieve low back pain OBJECTIVE Modalities Rationale:     decrease edema, decrease inflammation, decrease pain and increase tissue extensibility to improve patient's ability to perform ADLs 10 min [x] Estim, type/location: IFC low back, post hip    
                                 []  att     [x]  unatt     []  w/US     [x]  w/ice    []  w/heat  
 min []  Mechanical Traction: type/lbs   
               []  pro   []  sup   []  int   []  cont    []  before manual    []  after manual  
 min []  Ultrasound, settings/location:    
 min []  Iontophoresis w/ dexamethasone, location:   
                                           []  take home patch       []  in clinic  
 min []  Ice     []  Heat    location/position:   
 min []  Vasopneumatic Device, press/temp:   
 min []  Other:   
[x] Skin assessment post-treatment (if applicable):   
[x]  intact    []  redness- no adverse reaction    
[]redness  adverse reaction:     
 
31 min Therapeutic Exercise:  [x]  See flow sheet Rationale:      increase ROM and increase strength to improve the patients ability to tolerated prolonged positioning 11 min Manual Therapy: Prone left piriformis release; STM/DTM to lower lumbar paraspoinals. MFR to left QL Rationale:      decrease pain, increase ROM, increase tissue extensibility and decrease trigger points to improve patient's ability to improve tissue mobility in functional ADLs 8 min Patient Education:  YES  Reviewed HEP []  Progressed/Changed HEP based on: Other Objective/Functional Measures: FOTO: 50  
Post Treatment Pain Level (on 0 to 10) scale:   5 / 10 ASSESSMENT Assessment/Changes in Function:  
 
See progress note [x]  See Progress Note/Recertification Patient will continue to benefit from skilled PT services to modify and progress therapeutic interventions, address functional mobility deficits, address ROM deficits, address strength deficits, analyze and address soft tissue restrictions and instruct in home and community integration to attain remaining goals. Progress toward goals / Updated goals: 
 
See progress note PLAN 
[]  Upgrade activities as tolerated YES Continue plan of care  
[]  Discharge due to :   
[]  Other:   
 
Therapist: Sabi Chavez PTA Date: 10/9/2018 Time: 4:29  PM  
 
Future Appointments Date Time Provider Keesha Smith 10/11/2018 3:30 PM Sabi Chavez PTA [facility]  
10/16/2018 4:00 PM Sabi Chavez PTA [facility]  
10/18/2018 3:30 PM Sabi Chavez PTA [facility]  
10/23/2018 4:00 PM Sabi Chavez PTA [facility]

## 2018-10-11 ENCOUNTER — HOSPITAL ENCOUNTER (OUTPATIENT)
Dept: PHYSICAL THERAPY | Age: 53
Discharge: HOME | End: 2018-10-11
Payer: COMMERCIAL

## 2018-10-11 PROCEDURE — 97014 ELECTRIC STIMULATION THERAPY: CPT

## 2018-10-11 PROCEDURE — 97140 MANUAL THERAPY 1/> REGIONS: CPT

## 2018-10-11 PROCEDURE — 97110 THERAPEUTIC EXERCISES: CPT

## 2018-10-11 NOTE — PROGRESS NOTES
PHYSICAL THERAPY - DAILY TREATMENT NOTE Patient Name: Heidi Montenegro        Date: 10/11/2018 : 1965   YES Patient  Verified Visit #:   7     Insurance: Payor: BLUE CROSS / Plan: [facility] PPO / Product Type: PPO / In time: 3:27 pm Out time: 4:04 pm  
Total Treatment Time: 37 Medicare Time Tracking (below) Total Timed Codes (min):  na 1:1 Treatment Time:  na  
TREATMENT AREA =  Other intervertebral disc degeneration, lumbar region [M51.36] Lesion of sciatic nerve, left lower limb [G57.02] SUBJECTIVE Pain Level (on 0 to 10 scale): 8  / 10- ot left calf Medication Changes/New allergies or changes in medical history, any new surgeries or procedures? NO    If yes, update Summary List  
Subjective Functional Status/Changes:  []  No changes reported Pt reports  The pain is worse today. Pt reports it started yesterday. He thinks it may be from the weather \"I finally heard from Dr. Joe Donato about my epidural on the . OBJECTIVE Modalities Rationale:     decrease edema, decrease inflammation, decrease pain and increase tissue extensibility to improve patient's ability to perform functional ADLS 
10 min [x] Estim, type/location: IFC prone over 1 pillow x 6 min; 4 min in right sidelying   
                                 []  att     []  unatt     []  w/US     [x]  w/ice    []  w/heat 
 min []  Mechanical Traction: type/lbs   
               []  pro   []  sup   []  int   []  cont    []  before manual    []  after manual  
 min []  Ultrasound, settings/location:    
 min []  Iontophoresis w/ dexamethasone, location:   
                                           []  take home patch       []  in clinic  
 min []  Ice     []  Heat    location/position:   
 min []  Vasopneumatic Device, press/temp:   
 min []  Other:   
[x] Skin assessment post-treatment (if applicable):   
[x]  intact    []  redness- no adverse reaction    
[]redness  adverse reaction: 17 min Therapeutic Exercise:  [x]  See flow sheet Rationale:      increase ROM and increase strength to improve the patients ability to perform ADLs 10 min Manual Therapy: Corrected right anterior innominate with MET; prone STM/DTM to lower lumbar paraspinals; left piriformis release Rationale:      decrease pain, increase ROM and increase tissue extensibility to improve patient's ability to improve tissue mobility in ADLs 
 min Patient Education:  YES  Reviewed HEP []  Progressed/Changed HEP based on: Other Objective/Functional Measures: Add left side glides against wall Post Treatment Pain Level (on 0 to 10) scale:  6 / 10- left hip ASSESSMENT Assessment/Changes in Function:  
Pt able to centralize pain to low back with left side glides against the wall. Unable to abolish pain. Review HEP, positioning for pain management and centralization of symptoms. Corrected right SI innominate with MET. Pt positioning changed after 6 min of IFC with ice from prone over 1 pillow to right side lying. Pt demonstrating good pain relief with use of IFC and ice. Discussed with pt that he may benefit from home TENS unit. []  See Progress Note/Recertification Patient will continue to benefit from skilled PT services to modify and progress therapeutic interventions, address functional mobility deficits, address ROM deficits, address strength deficits, analyze and address soft tissue restrictions, analyze and cue movement patterns, assess and modify postural abnormalities and instruct in home and community integration to attain remaining goals. Progress toward goals / Updated goals: 
Continue toward all current updated goals PLAN 
[]  Upgrade activities as tolerated YES Continue plan of care  
[]  Discharge due to :   
[]  Other:   
 
Therapist: Jorden Feliz PTA Date: 10/11/2018 Time: 4:04  PM  
 
Future Appointments Date Time Provider Keesha Smith [facility]2018 [facility]  
10/16/2018 4:00 PM Melissa Govea PTA [facility]  
10/18/2018 3:30 PM Melissa Govea PTA [facility]  
10/23/2018 4:00 PM Melissa Govea PTA [facility]

## 2018-10-23 ENCOUNTER — HOSPITAL ENCOUNTER (OUTPATIENT)
Dept: PHYSICAL THERAPY | Age: 53
End: 2018-10-23
Payer: COMMERCIAL

## 2018-11-06 ENCOUNTER — APPOINTMENT (OUTPATIENT)
Dept: PHYSICAL THERAPY | Age: 53
End: 2018-11-06

## 2018-11-14 DIAGNOSIS — M51.36 DEGENERATIVE DISC DISEASE, LUMBAR: ICD-10-CM

## 2018-11-14 DIAGNOSIS — M48.061 FORAMINAL STENOSIS OF LUMBAR REGION: ICD-10-CM

## 2018-11-14 DIAGNOSIS — G57.02 PIRIFORMIS SYNDROME, LEFT: ICD-10-CM

## 2018-11-14 DIAGNOSIS — Z98.890 H/O LAMINECTOMY: ICD-10-CM

## 2018-11-14 NOTE — TELEPHONE ENCOUNTER
Last Visit: 10/01/2018 with MD Hernesto Orellana  Next Appointment: none  Previous Refill Encounter(s): 10/01/2018 per MD Hernesto Orellana #[id number]    Requested Prescriptions     Pending Prescriptions Disp Refills    oxyCODONE-acetaminophen (PERCOCET)  mg per tablet 21 Tab 0     Sig: Take 1 Tab by mouth every eight (8) hours as needed for Pain. Max Daily Amount: 3 Tabs.

## 2018-11-15 RX ORDER — OXYCODONE AND ACETAMINOPHEN 10; 325 MG/1; MG/1
1 TABLET ORAL
Qty: 21 TAB | Refills: 0 | OUTPATIENT
Start: 2018-11-15

## 2018-11-15 NOTE — TELEPHONE ENCOUNTER
Woman picked up stating that Lupe Schlatter was not home and directed me to call Pt at mobile number ([phone]).

## 2018-11-15 NOTE — TELEPHONE ENCOUNTER
Called and left message for Pt informing him that he needs to call and schedule an appointment with Dr. Kirk Garcia if he would like his Rx medication refilled.

## 2018-12-11 ENCOUNTER — OFFICE VISIT (OUTPATIENT)
Dept: ORTHOPEDIC SURGERY | Age: 53
End: 2018-12-11

## 2018-12-11 VITALS
SYSTOLIC BLOOD PRESSURE: 143 MMHG | TEMPERATURE: 98.1 F | DIASTOLIC BLOOD PRESSURE: 92 MMHG | RESPIRATION RATE: 15 BRPM | HEART RATE: 100 BPM | BODY MASS INDEX: 30.95 KG/M2 | WEIGHT: 221.1 LBS | HEIGHT: 71 IN

## 2018-12-11 DIAGNOSIS — M51.36 DEGENERATIVE DISC DISEASE, LUMBAR: ICD-10-CM

## 2018-12-11 DIAGNOSIS — M48.061 FORAMINAL STENOSIS OF LUMBAR REGION: ICD-10-CM

## 2018-12-11 DIAGNOSIS — Z98.890 H/O LAMINECTOMY: ICD-10-CM

## 2018-12-11 DIAGNOSIS — G57.02 PIRIFORMIS SYNDROME, LEFT: ICD-10-CM

## 2018-12-11 RX ORDER — OXYCODONE AND ACETAMINOPHEN 10; 325 MG/1; MG/1
1 TABLET ORAL
Qty: 21 TAB | Refills: 0 | Status: SHIPPED | OUTPATIENT
Start: 2018-12-11 | End: 2019-01-31 | Stop reason: SDUPTHER

## 2018-12-11 RX ORDER — PREDNISONE 20 MG/1
TABLET ORAL
Qty: 28 TAB | Refills: 0 | Status: SHIPPED | OUTPATIENT
Start: 2018-12-11 | End: 2019-01-31 | Stop reason: ALTCHOICE

## 2018-12-11 NOTE — PROGRESS NOTES
HISTORY OF PRESENT ILLNESS    Katia Carty is a 48y.o. year old male comes in today to be evaluated and treated for follow-up: back pain    Since last appt has noticed improvement in pain from [facility] a couple weeks ago but lasted 3-4 days. Pain level 8/10. Using prednisone and percocet with benefit. Was supposed to f/u yesterday but missed appt as so much pain. IMAGING: MRI lumbar 7/14/18   IMPRESSION:  Disc bulge left S1 descending nerve root w/ left lateral foraminal stenosis. Social History     Socioeconomic History    Marital status:      Spouse name: Not on file    Number of children: Not on file    Years of education: Not on file    Highest education level: Not on file   Tobacco Use    Smoking status: Current Every Day Smoker     Types: Cigarettes    Smokeless tobacco: Never Used   Substance and Sexual Activity    Alcohol use: Yes     Comment: occasionally    Drug use: No     Current Outpatient Medications   Medication Sig Dispense Refill    oxyCODONE-acetaminophen (PERCOCET)  mg per tablet Take 1 Tab by mouth every eight (8) hours as needed for Pain. Max Daily Amount: 3 Tabs. 21 Tab 0    ibuprofen (MOTRIN) 200 mg tablet Take  by mouth. Past Medical History:   Diagnosis Date    Acid reflux     Chronic back pain     Hematuria     Hematuria, microscopic     Nocturia      Family History   Family history unknown: Yes         ROS:  Numb/tingle left left from buttocks.  No incont, fever. Objective:  BP (!) 143/92   Pulse 100   Temp 98.1 °F (36.7 °C)   Resp 15   Ht 5' 11\" (1.803 m)   Wt 221 lb 1.6 oz (100.3 kg)   BMI 30.84 kg/m²   GEN:  Appears stated age in NAD. NEURO:  Sensation intact to light touch.  Reflexes +2/4 patellar and Achilles bilaterally. M/S:  Examined seated and supine.  Slump negative.  Standing flexion test positive right  Stork positive left.  ASIS low right  Iliac crests equal bilaterally Pubes equal bilaterally Medial malleolus low right  Sacral base posterior left  JENNIFER low right  Sphinx test Positive left TTA at [facility] left worse flexion Rib(s) not TTP and equal LE Strength +5/5 bilaterally Piriformis tight and TTP left  EXT:  no clubbing/cyanosis.  no edema. SKIN: Warm & dry w/o rash. Assessment/Plan:     ICD-10-CM ICD-9-CM    1. Degenerative disc disease, lumbar M51.36 722.52    2. Piriformis syndrome, left G57.02 355.0    3. H/O laminectomy Z98.890 V45.89    4. Foraminal stenosis of lumbar region M99.83 724.02      Orders Placed This Encounter    oxyCODONE-acetaminophen (PERCOCET)  mg per tablet     Sig: Take 1 Tab by mouth every eight (8) hours as needed for Pain. Max Daily Amount: 3 Tabs. Dispense:  21 Tab     Refill:  0    predniSONE (DELTASONE) 20 mg tablet     Sig: Take 2 tabs in AM with food for 7 days then 1 tab in AM with food until gone     Dispense:  28 Tab     Refill:  0     Patient (or guardian if minor) verbalizes understanding of evaluation and plan. Will Rx #21 percocet but will narinder appt for additional and will continue w/ [facility] for management and consider another epidural/surgery/pain management options for relief.  reviewed. [facility] to handle paperwork regarding employment/disabilty and patient aware.

## 2018-12-11 NOTE — PROGRESS NOTES
Pt reports having had epidurals a few weeks ago. Pain relief was only temporary; has now returned. Some relief with PT  Had been suggested that he may need surgery  Has no more Percocet, had provided some relief.  Pt requests Prednisone

## 2018-12-11 NOTE — PATIENT INSTRUCTIONS
Learning About Degenerative Disc Disease  What is degenerative disc disease? Degenerative disc disease is not really a disease. It's a term used to describe the normal changes in your spinal discs as you age. Spinal discs are small, spongy discs that separate the bones (vertebrae) that make up the spine. The discs act as shock absorbers for the spine, so it can flex, bend, and twist.  Degenerative disc disease can take place in one or more places along the spine. It most often occurs in the discs in the lower back and the neck. What causes it? As we age, our spinal discs break down, or degenerate. This breakdown causes the symptoms of degenerative disc disease in some people. When the discs break down, they can lose fluid and dry out, and their outer layers can have tiny cracks or tears. This leads to less padding and less space between the bones in the spine. The body reacts to this by making bony growths on the spine called bone spurs. These spurs can press on the spinal nerve roots or spinal cord. This can cause pain and can affect how well the nerves work. What are the symptoms? Many people with degenerative disc disease have no pain. But others have severe pain or other symptoms that limit their activities. Some of the most common symptoms are:  · Pain in the back or neck. Where the pain occurs depends on which discs are affected. · Pain that gets worse when you move, such as bending over, reaching up, or twisting. · Pain that may occur in the rear end (buttocks), arm, or leg if a nerve is pinched. · Numbness or tingling in your arm or leg. How is it diagnosed? A doctor can often diagnose degenerative disc disease while doing a physical exam. If your exam shows no signs of a serious condition, imaging tests (such as an X-ray) aren't likely to help your doctor find the cause of your symptoms.   Sometimes degenerative disc disease is found when an X-ray is taken for another reason, such as an injury or other health problem. But even if the doctor finds degenerative disc disease, that doesn't always mean that you will have symptoms. How is it treated? There are several things you can do at home to manage pain from this problem. · To relieve pain, use ice or heat (whichever feels better) on the affected area. ? Put ice or a cold pack on the area for 10 to 20 minutes at a time. Put a thin cloth between the ice and your skin. ? Put a warm water bottle, a heating pad set on low, or a warm cloth on your back. Put a thin cloth between the heating pad and your skin. Do not go to sleep with a heating pad on your skin. · Ask your doctor if you can take acetaminophen (such as Tylenol) or nonsteroidal anti-inflammatory drugs, such as ibuprofen or naproxen. Your doctor can prescribe stronger medicines if needed. Be safe with medicines. Read and follow all instructions on the label. · Get some exercise every day. Exercise is one of the best ways to help your back feel better and stay better. It's best to start each exercise slowly. You may notice a little soreness, and that's okay. But if an exercise makes your pain worse, stop doing it. Here are things you can try:  ? Walking. It's the simplest and maybe the best activity for your back. It gets your blood moving and helps your muscles stay strong. ? Exercises that gently stretch and strengthen your stomach, back, and leg muscles. The stronger those muscles are, the better they're able to protect your back. If you have constant or severe pain in your back or spine, you may need other treatments, such as physical therapy. In some cases, your doctor may suggest surgery. Follow-up care is a key part of your treatment and safety. Be sure to make and go to all appointments, and call your doctor if you are having problems. It's also a good idea to know your test results and keep a list of the medicines you take. Where can you learn more?   Go to http://silas-celia.info/. Enter L246 in the search box to learn more about \"Learning About Degenerative Disc Disease. \"  Current as of: November 29, 2017  Content Version: 11.8  © 0987-6889 [employer]. Care instructions adapted under license by "[employer] (which disclaims liability or warranty for this information). If you have questions about a medical condition or this instruction, always ask your healthcare professional. [place] [address] any warranty or liability for your use of this information.

## 2019-01-03 NOTE — PROGRESS NOTES
[address] [place] [address] PHYSICAL THERAPY AT [address] [facility] [address], [address], [address] - Phone: [phone]  Fax: [phone] DISCHARGE SUMMARY Patient Name: Shireen Area : 1965 Treatment/Medical Diagnosis: Other intervertebral disc degeneration, lumbar region [M51.36] Lesion of sciatic nerve, left lower limb [G57.02] Referral Source: Audra Tijerina DO Date of Initial Visit: 18 Attended Visits: 7 Missed Visits: 7 SUMMARY OF TREATMENT Physical Therapy treatment has consisted of Therapeutic exercise for lumbar stabilization and ROM, Manual Therapy, Interferential electrical stimulation, Home TENs request, HEP, and moist heat CURRENT STATUS Patient was unable to be formally reassessed secondary to not returning to PT after 10-11-18 visit. 1. Pt will demonstrate an increased FOTO score to 56/100 in order to improve function. Partially met FOTO: 50/100 2. Pt will be independent with HEP at D/C for self management.-Partially met: pt I in initial HEP. 3. Pt will be able to perform a bridge at > 75% completion without pain in order to increase lumbopelvic stability and reduce future injury- unable to be reassessed. 4. Pt will be able to bend to the floor to put on shoes without LBP in order to perform functional activities requiring L-S flexion- unable to be reassessed. 5. Pt will improve bilateral hip flexion by 5 degrees actively in order to increase terminal hip extension with ambulation -unable to be reassessed 6. Pt will report a return to sleeping in his bed in order to prevent secondary impairments associated with long term recliner use-unable to be reassessed RECOMMENDATIONS Discontinue therapy due to lack of attendance or compliance. If you have any questions/comments please contact us directly at [phone]. Thank you for allowing us to assist in the care of your patient. LPTA Signature: Jemima Payton [place] Date: 10- Therapist Signature:  Ezequiel Soto, KIM Time: 12:40 PM

## 2019-01-31 ENCOUNTER — OFFICE VISIT (OUTPATIENT)
Dept: ORTHOPEDIC SURGERY | Age: 54
End: 2019-01-31

## 2019-01-31 VITALS
DIASTOLIC BLOOD PRESSURE: 79 MMHG | HEIGHT: 71 IN | HEART RATE: 78 BPM | SYSTOLIC BLOOD PRESSURE: 111 MMHG | RESPIRATION RATE: 15 BRPM | TEMPERATURE: 98.5 F | BODY MASS INDEX: 32.76 KG/M2 | WEIGHT: 234 LBS

## 2019-01-31 DIAGNOSIS — G57.02 PIRIFORMIS SYNDROME, LEFT: ICD-10-CM

## 2019-01-31 DIAGNOSIS — Z98.890 H/O LAMINECTOMY: ICD-10-CM

## 2019-01-31 DIAGNOSIS — M51.36 DEGENERATIVE DISC DISEASE, LUMBAR: ICD-10-CM

## 2019-01-31 DIAGNOSIS — M48.061 FORAMINAL STENOSIS OF LUMBAR REGION: Primary | ICD-10-CM

## 2019-01-31 RX ORDER — OXYCODONE AND ACETAMINOPHEN 10; 325 MG/1; MG/1
1 TABLET ORAL
Qty: 21 TAB | Refills: 0 | Status: SHIPPED | OUTPATIENT
Start: 2019-01-31

## 2019-01-31 NOTE — PROGRESS NOTES
Dr Augustina Bañuelos did epidural to lower back on the past Friday. Dr Augustina Bañuelos recommending MRI and possible surgery.

## 2019-01-31 NOTE — PROGRESS NOTES
HISTORY OF PRESENT ILLNESS    Drea Marie is a 48y.o. year old male comes in today to be evaluated and treated for follow-up: back pain/ ruptured discs. Since last appt has noticed worsening pain into back legs after epidural B/L Dr. Gloria Granados Friday 1/25/19. Pain level 8/10. Plan is MRI and likely another epidural and if not adequate surgery. Will be retiring due to this issue and paperwork being submitted. Dr. Gloria Granados and there office does not Rx opiates so hoping to refill that for him today. Had beenfit years ago w/ injections fom Dr. Zelaya Mustache would last 6 months or so and no mre than 3 week benefit with last 2 sets injections. IMAGING: MRI lumbar 7/14/18  IMPRESSION:  Disc bulge left S1 descending nerve root w/ left lateral foraminal stenosis. Past Surgical History:   Procedure Laterality Date    HX OTHER SURGICAL      back surgery    HX OTHER SURGICAL      epidural to low back By Dr Teresita Hood Marital status:      Spouse name: Not on file    Number of children: Not on file    Years of education: Not on file    Highest education level: Not on file   Tobacco Use    Smoking status: Current Every Day Smoker     Types: Cigarettes    Smokeless tobacco: Never Used   Substance and Sexual Activity    Alcohol use: Yes     Comment: occasionally    Drug use: No     Current Outpatient Medications   Medication Sig Dispense Refill    oxyCODONE-acetaminophen (PERCOCET)  mg per tablet Take 1 Tab by mouth every eight (8) hours as needed for Pain. Max Daily Amount: 3 Tabs. 21 Tab 0    ibuprofen (MOTRIN) 200 mg tablet Take  by mouth.        Past Medical History:   Diagnosis Date    Acid reflux     Chronic back pain     Hematuria     Hematuria, microscopic     Nocturia      Family History   Family history unknown: Yes       ROS:  Numb/tingle left from buttocks.      Objective:  /79   Pulse 78   Temp 98.5 °F (36.9 °C)   Resp 15   Ht 5' 11\" (1.803 m) Wt 234 lb (106.1 kg)   BMI 32.64 kg/m²   GEN:  Appears stated age in NAD. NEURO:  Sensation intact to light touch.  Reflexes +2/4 patellar and Achilles bilaterally. M/S:  Examined seated and supine.  Slump negative. Standing flexion test positive right  Stork positive left.  ASIS low right  Iliac crests equal bilaterally Pubes equal bilaterally Medial malleolus low right  Sacral base posterior left  JENNIFER low right  Sphinx test Positive left TTA at [facility] left worse flexion Rib(s) not TTP and equal LE Strength +5/5 bilaterally Piriformis tight and TTP left  EXT:  no clubbing/cyanosis.  no edema. SKIN: Warm & dry w/o rash. Assessment/Plan:     ICD-10-CM ICD-9-CM    1. Foraminal stenosis of lumbar region M99.83 724.02    2. Degenerative disc disease, lumbar M51.36 722.52    3. Piriformis syndrome, left G57.02 355.0    4. H/O laminectomy B72.671 V45.89        Patient (or guardian if minor) verbalizes understanding of evaluation and plan. Discussed Dr. Irena Gamez doing injections and will organize MRI and likely inject again. I do not Rx opiates for chronic pain but w/ flare after epidural and Dr. Irena Gamez not Rx opiates will give 1 week supply and follow w/ PMR for Tx and documentation/disability/work comp.  reviewed.

## 2024-11-21 ENCOUNTER — HOSPITAL ENCOUNTER (EMERGENCY)
Facility: HOSPITAL | Age: 59
Discharge: HOME | End: 2024-11-21
Attending: EMERGENCY MEDICINE
Payer: MEDICARE

## 2024-11-21 ENCOUNTER — APPOINTMENT (OUTPATIENT)
Facility: HOSPITAL | Age: 59
End: 2024-11-21
Payer: MEDICARE

## 2024-11-21 VITALS
DIASTOLIC BLOOD PRESSURE: 84 MMHG | SYSTOLIC BLOOD PRESSURE: 140 MMHG | TEMPERATURE: 98.1 F | OXYGEN SATURATION: 99 % | WEIGHT: 244.27 LBS | RESPIRATION RATE: 18 BRPM | HEART RATE: 82 BPM | BODY MASS INDEX: 34.07 KG/M2

## 2024-11-21 DIAGNOSIS — K56.41 FECAL IMPACTION (HCC): Primary | ICD-10-CM

## 2024-11-21 DIAGNOSIS — E86.0 DEHYDRATION: ICD-10-CM

## 2024-11-21 DIAGNOSIS — K59.00 CONSTIPATION, UNSPECIFIED CONSTIPATION TYPE: ICD-10-CM

## 2024-11-21 LAB
ABO + RH BLD: NORMAL
ALBUMIN SERPL-MCNC: 3.8 G/DL (ref 3.4–5)
ALBUMIN/GLOB SERPL: 1 (ref 0.8–1.7)
ALP SERPL-CCNC: 79 U/L (ref 45–117)
ALT SERPL-CCNC: 31 U/L (ref 16–61)
ANION GAP SERPL CALC-SCNC: 6 MMOL/L (ref 3–18)
APPEARANCE UR: CLEAR
APTT PPP: 25.6 SEC (ref 23–36.4)
AST SERPL-CCNC: 18 U/L (ref 10–38)
BACTERIA URNS QL MICRO: NEGATIVE /HPF
BASOPHILS # BLD: 0.1 K/UL (ref 0–0.1)
BASOPHILS NFR BLD: 1 % (ref 0–2)
BILIRUB SERPL-MCNC: 0.3 MG/DL (ref 0.2–1)
BILIRUB UR QL: NEGATIVE
BLOOD GROUP ANTIBODIES SERPL: NORMAL
BUN SERPL-MCNC: 15 MG/DL (ref 7–18)
BUN/CREAT SERPL: 12 (ref 12–20)
CALCIUM SERPL-MCNC: 9.8 MG/DL (ref 8.5–10.1)
CHLORIDE SERPL-SCNC: 104 MMOL/L (ref 100–111)
CO2 SERPL-SCNC: 27 MMOL/L (ref 21–32)
COLOR UR: YELLOW
CREAT SERPL-MCNC: 1.26 MG/DL (ref 0.6–1.3)
DIFFERENTIAL METHOD BLD: ABNORMAL
EOSINOPHIL # BLD: 0.1 K/UL (ref 0–0.4)
EOSINOPHIL NFR BLD: 1 % (ref 0–5)
EPITH CASTS URNS QL MICRO: NORMAL /LPF (ref 0–5)
ERYTHROCYTE [DISTWIDTH] IN BLOOD BY AUTOMATED COUNT: 14.8 % (ref 11.6–14.5)
GLOBULIN SER CALC-MCNC: 3.7 G/DL (ref 2–4)
GLUCOSE SERPL-MCNC: 198 MG/DL (ref 74–99)
GLUCOSE UR STRIP.AUTO-MCNC: NEGATIVE MG/DL
HCT VFR BLD AUTO: 49.4 % (ref 36–48)
HGB BLD-MCNC: 15.8 G/DL (ref 13–16)
HGB UR QL STRIP: ABNORMAL
IMM GRANULOCYTES # BLD AUTO: 0 K/UL (ref 0–0.04)
IMM GRANULOCYTES NFR BLD AUTO: 0 % (ref 0–0.5)
KETONES UR QL STRIP.AUTO: NEGATIVE MG/DL
LEUKOCYTE ESTERASE UR QL STRIP.AUTO: NEGATIVE
LYMPHOCYTES # BLD: 3.5 K/UL (ref 0.9–3.6)
LYMPHOCYTES NFR BLD: 42 % (ref 21–52)
MAGNESIUM SERPL-MCNC: 2.1 MG/DL (ref 1.6–2.6)
MCH RBC QN AUTO: 25.4 PG (ref 24–34)
MCHC RBC AUTO-ENTMCNC: 32 G/DL (ref 31–37)
MCV RBC AUTO: 79.4 FL (ref 78–100)
MONOCYTES # BLD: 0.3 K/UL (ref 0.05–1.2)
MONOCYTES NFR BLD: 4 % (ref 3–10)
NEUTS SEG # BLD: 4.3 K/UL (ref 1.8–8)
NEUTS SEG NFR BLD: 53 % (ref 40–73)
NITRITE UR QL STRIP.AUTO: NEGATIVE
NRBC # BLD: 0 K/UL (ref 0–0.01)
NRBC BLD-RTO: 0 PER 100 WBC
PH UR STRIP: 6.5 (ref 5–8)
PLATELET # BLD AUTO: 383 K/UL (ref 135–420)
PMV BLD AUTO: 9 FL (ref 9.2–11.8)
POTASSIUM SERPL-SCNC: 3.8 MMOL/L (ref 3.5–5.5)
PROT SERPL-MCNC: 7.5 G/DL (ref 6.4–8.2)
PROT UR STRIP-MCNC: NEGATIVE MG/DL
RBC # BLD AUTO: 6.22 M/UL (ref 4.35–5.65)
RBC #/AREA URNS HPF: NORMAL /HPF (ref 0–5)
SODIUM SERPL-SCNC: 137 MMOL/L (ref 136–145)
SP GR UR REFRACTOMETRY: >1.03 (ref 1–1.04)
SPECIMEN EXP DATE BLD: NORMAL
TROPONIN I SERPL HS-MCNC: 8 NG/L (ref 0–78)
TSH SERPL DL<=0.05 MIU/L-ACNC: 1.32 UIU/ML (ref 0.36–3.74)
UROBILINOGEN UR QL STRIP.AUTO: 1 EU/DL (ref 0.2–1)
WBC # BLD AUTO: 8.3 K/UL (ref 4.6–13.2)
WBC URNS QL MICRO: NEGATIVE /HPF (ref 0–5)

## 2024-11-21 PROCEDURE — 80053 COMPREHEN METABOLIC PANEL: CPT

## 2024-11-21 PROCEDURE — 84443 ASSAY THYROID STIM HORMONE: CPT

## 2024-11-21 PROCEDURE — 86850 RBC ANTIBODY SCREEN: CPT

## 2024-11-21 PROCEDURE — 86901 BLOOD TYPING SEROLOGIC RH(D): CPT

## 2024-11-21 PROCEDURE — 93005 ELECTROCARDIOGRAM TRACING: CPT | Performed by: EMERGENCY MEDICINE

## 2024-11-21 PROCEDURE — 74177 CT ABD & PELVIS W/CONTRAST: CPT

## 2024-11-21 PROCEDURE — 87086 URINE CULTURE/COLONY COUNT: CPT

## 2024-11-21 PROCEDURE — 83735 ASSAY OF MAGNESIUM: CPT

## 2024-11-21 PROCEDURE — 99285 EMERGENCY DEPT VISIT HI MDM: CPT

## 2024-11-21 PROCEDURE — 86900 BLOOD TYPING SEROLOGIC ABO: CPT

## 2024-11-21 PROCEDURE — 84484 ASSAY OF TROPONIN QUANT: CPT

## 2024-11-21 PROCEDURE — 81001 URINALYSIS AUTO W/SCOPE: CPT

## 2024-11-21 PROCEDURE — 85730 THROMBOPLASTIN TIME PARTIAL: CPT

## 2024-11-21 PROCEDURE — 6360000004 HC RX CONTRAST MEDICATION: Performed by: EMERGENCY MEDICINE

## 2024-11-21 PROCEDURE — 85025 COMPLETE CBC W/AUTO DIFF WBC: CPT

## 2024-11-21 RX ORDER — IOPAMIDOL 612 MG/ML
100 INJECTION, SOLUTION INTRAVASCULAR
Status: COMPLETED | OUTPATIENT
Start: 2024-11-21 | End: 2024-11-21

## 2024-11-21 RX ORDER — POLYETHYLENE GLYCOL 3350 17 G/17G
17 POWDER, FOR SOLUTION ORAL DAILY
Qty: 30 EACH | Refills: 0 | Status: SHIPPED | OUTPATIENT
Start: 2024-11-21 | End: 2024-12-21

## 2024-11-21 RX ORDER — BISACODYL 10 MG
10 SUPPOSITORY, RECTAL RECTAL DAILY
Qty: 10 SUPPOSITORY | Refills: 0 | Status: SHIPPED | OUTPATIENT
Start: 2024-11-21 | End: 2024-12-01

## 2024-11-21 RX ADMIN — IOPAMIDOL 100 ML: 612 INJECTION, SOLUTION INTRAVENOUS at 18:03

## 2024-11-21 ASSESSMENT — PAIN - FUNCTIONAL ASSESSMENT: PAIN_FUNCTIONAL_ASSESSMENT: 0-10

## 2024-11-21 ASSESSMENT — PAIN SCALES - GENERAL: PAINLEVEL_OUTOF10: 10

## 2024-11-21 NOTE — ED PROVIDER NOTES
EMERGENCY DEPARTMENT HISTORY AND PHYSICAL EXAM    8:22 PM      Date: 11/21/2024  Patient Name: Richard Reilly  [id number]  Chief Complaint   Patient presents with    Rectal Bleeding    Fecal Impaction         Medical Decision Making   I am the first provider for this patient. I reviewed the vital signs, available nursing notes, past medical history, past surgical history, family history and social history.    Richard Reilly is a 59 y.o. male who  has a past medical history of Acid reflux and Chronic back pain..  Richard Reilly is a 59 y.o. male presenting with constipation for 2 days that has made it difficult for him to urinate.  He is never had this happen before.  He is in moderate distress secondary to the discomfort and feels the stool down to his rectum but he cannot push it out and has gotten a little blood from it.  On chaperoned rectal exam there is a rectal ball high enough in the rectal vault to be touched but not removed on physical exam.  Will obtain labs, CT for new onset constipation, and soapsuds enema has been ordered and will ensure patient able to urinate without infection in the urine  ER Course including (if applicable) independent interpretations of EKG/imaging and conversations with consultants:  ED Course as of 11/21/24 2022   Thu Nov 21, 2024   1519 Patient successfully disimpacted at bedside without immediate complication [JG]   1639 WBC: 8.3 [JG]   1639 Hemoglobin Quant: 15.8 [JG]   1639 Platelet Count: 383 [JG]   1759 12 lead EKG interpretation:  Ventricular rate is 79 bpm  QTc is 450 ms  Axis: 39, 55, 8      [JG]   1850 CT ABDOMEN PELVIS W IV CONTRAST Additional Contrast? None  IMPRESSION:  1.  No acute pathology appreciated in the abdomen or pelvis.   [JG]   1958 Urine at bedside, staff to send now, patient is feeling much better and bowel regimen planned for discharge [JG]   2022 Color, UA: YELLOW [JG]      ED Course User Index  [JG] Tricia Belcher DO       DOCUMENTATION/RECORDS

## 2024-11-22 LAB
EKG ATRIAL RATE: 79 BPM
EKG DIAGNOSIS: NORMAL
EKG P AXIS: 39 DEGREES
EKG P-R INTERVAL: 164 MS
EKG Q-T INTERVAL: 362 MS
EKG QRS DURATION: 88 MS
EKG QTC CALCULATION (BAZETT): 415 MS
EKG R AXIS: 55 DEGREES
EKG T AXIS: 8 DEGREES
EKG VENTRICULAR RATE: 79 BPM

## 2024-11-22 PROCEDURE — 93010 ELECTROCARDIOGRAM REPORT: CPT | Performed by: INTERNAL MEDICINE

## 2024-11-22 NOTE — DISCHARGE INSTRUCTIONS
Drink plenty of fluids as the job of the colon is to remove water from the stool and the stool will become harder the longer it sits in the colon.  Eat high fiber foods such as fruit, vegetables, grains, brown rice and brown pasta.  Take the medications as prescribed and follow up with your doctor.  Return to the ER for any new, worsening, persistent symptoms or concerns including but not limited to abdominal pain, chest pain, vomiting, dark tarry or bloody stools, fever, weakness, or feeling faint.

## 2024-11-23 LAB
BACTERIA SPEC CULT: NORMAL
SERVICE CMNT-IMP: NORMAL